# Patient Record
Sex: FEMALE | Race: WHITE
[De-identification: names, ages, dates, MRNs, and addresses within clinical notes are randomized per-mention and may not be internally consistent; named-entity substitution may affect disease eponyms.]

---

## 2018-02-08 NOTE — ER DOCUMENT REPORT
HPI





- HPI


Pain Level: 3


Notes: 





Patient is a 27-year-old female who presents to the ED complaining of nasal 

congestion/discharge, dry nonproductive cough, fever, body ache 2 days.  

Patient states that she is still eating and drinking without difficulties, but 

does have a decreased p.o. intake.  She is still urinating normally having 

normal bowel movements.  Patient has been using some over-the-counter meds for 

symptoms.  She denies any significant past medical history including 

cardiopulmonary history and immunocompromised conditions.  Patient denies any 

smoking or IV drug use.  Patient requesting work note.  Denies any current 

headache, neck pain, sore throat, chest pain, palpitations, syncope, shortness 

of breath, wheeze, dyspnea, abdominal pain, nausea/vomiting/diarrhea, urinary 

retention, dysuria, hematuria, or rash.





- ROS


Systems Reviewed and Negative: Yes All other systems reviewed and negative





- REPRODUCTIVE


LMP: 31 jan 17


Reproductive: REPORTS: Pregnant:





Past Medical History





- Social History


Smoking Status: Never Smoker


Family History: Reviewed & Not Pertinent





- Immunizations


Hx Diphtheria, Pertussis, Tetanus Vaccination: Yes - 6/29/14





Vertical Provider Document





- CONSTITUTIONAL


Agree With Documented VS: Yes


Notes: 





PHYSICAL EXAMINATION:





GENERAL: Well-appearing, well-nourished and in no acute distress.  A&Ox4.  

Answers questions appropriately.  Moves comfortably w/o notable distress





HEAD: Atraumatic, normocephalic.





EYES: Pupils equal round and reactive to light, extraocular movements intact, 

sclera anicteric, conjunctiva are normal.





ENT: EAC clear b/l.  TM's intact b/l without erythema, fluid, or perforation.  

Nares patent and with clear discharge.  oropharynx no erythema without 

exudates.  No tonsilar hypertrophy without erythema or exudate.  No palatine 

shift.  Uvula midline.  No tongue protrusion.  No drooling, hoarseness, or 

airway compromise.  Moist mucous membranes.  No sinus tenderness.





NECK: Normal range of motion, supple without lymphadenopathy.  No rigidity/

meningismus.





LUNGS: Breath sounds clear to auscultation bilaterally and equal.  No wheezes 

rales or rhonchi.  No retractions





HEART: Regular rate and rhythm without murmurs, rubs, gallops.





ABDOMEN: Soft, nontender, nondistended abdomen.  No guarding, no rebound.  No 

masses appreciated.  Normal bowel sounds present.  No CVA tenderness 

bilaterally.  No hepatosplenomegaly.





NEUROLOGICAL: Normal speech, normal gait.  Normal sensory, motor exams 





PSYCH: Normal mood, normal affect.





SKIN: Warm, Dry, normal turgor, no rashes or lesions noted.





- INFECTION CONTROL


TRAVEL OUTSIDE OF THE [place] IN LAST 30 DAYS: No





- RESPIRATORY


O2 Sat by Pulse Oximetry: 99





Course





- Re-evaluation


Re-evalutation: 





02/08/18 15:47


Patient is an afebrile, well-hydrated, 27-year-old female who presents ED with 

acute URI, suspect influenza.  Vitals are stable.  PE is otherwise 

unremarkable.  No labs or imaging warranted at this time based on H&P.  Patient 

has no significant cardiopulmonary or immunocompromised medical conditions.  

Patient's lungs are clear to auscultation bilaterally without tachycardia, 

hypoxia, or tachypnea.  Patient is tolerating p.o. without any difficulties.  

Low suspicion for any meningitis, sepsis, peritonsillar/pharyngeal abscess, 

respiratory compromise, severe dehydration, or other emergent systemic 

condition at this time.  Patient is aware this condition can change from 

initial presentation and she needs to monitor symptoms closely.  Conservative 

measures otherwise for symptoms.  Recheck with your PCM in 3-5 days.  Return to 

the ED with any worsening/concerning symptoms otherwise as reviewed in 

discharge.  Patient is in agreement.





- Vital Signs


Vital signs: 


 











Temp Pulse Resp BP Pulse Ox


 


 99.3 F   107 H  16   132/75 H  99 


 


 02/08/18 14:05  02/08/18 14:05  02/08/18 14:05  02/08/18 14:05  02/08/18 14:05














Discharge





- Discharge


Clinical Impression: 


 Influenza, Acute URI





Condition: Stable


Disposition: HOME, SELF-CARE


Instructions:  Upper Respiratory Illness ([facility]), Influenza ([facility])


Additional Instructions: 


Maintain adequate fluid intake


Take meds as directed


tylenol/ibuprofen as needed


over the counter cold medication as needed for symptoms


Humidified air may help


Wash your hands regularly


Wear a mask when coughing


F/u:  with your PCM in 3-5 days for a recheck





Return to the ED with any fever, worsening pain, chest pain, palpitations, 

syncope, worsening HA, neck pain/stiffness, shortness of breath, wheezing, 

drooling, trouble swallowing/breathing, abdominal pain, n/v/d, rash, or 

worsening/concerning symptoms otherwise.


Forms:  Elevated Blood Pressure, Return to Work


Referrals: 


[facility] [Provider Group] - Follow up as needed


[facility] [Provider Group] - Follow up as needed


[facility] [Provider Group] - Follow up as needed

## 2019-03-22 NOTE — OPERATIVE REPORT E
Operative Report



NAME: CÉSAR CHAVEZ

MRN:  [id number]          : 1990 AGE:  28Y

DATE OF SURGERY: 2019                        ROOM:



PREOPERATIVE DIAGNOSIS:

MISSED AB.



POSTOPERATIVE DIAGNOSIS:

MISSED AB.



OPERATION:

Suction dilatation and curettage.



SURGEON:

SOHAM LUNA M.D.



ANESTHESIA:

Dr. Albrecht, general.



FINDINGS:

Uterus sounded to approximately 10.5 cm.  Large amount of decidual type

material removed from the uterus.



ESTIMATED BLOOD LOSS:

200 mL



SPECIMENS REMOVED:

Products of conception.



PROCEDURE:

The patient was taken to the operating room, prepared and draped in normal

sterile fashion in dorsal lithotomy position.  Under sterile conditions,

the in and out catheter was performed with no sterile urine obtained.  A

sterile speculum was placed into the vagina and the cervix was grasped on

the anterior lip with a single-tooth tenaculum, and the cervix was prepped

with Betadine.  The uterus was then sounded to approximately 1.5 cm.  The

cervix was then dilated to accommodate an 8 mm curved curette, which was

introduced under suction and the uterine contents were evacuated using

suction.  Several passes were made until there was no further material

obtained.  A sharp curettage was performed and it was felt that there were

products of conception still present, so another couple of passes were

made with the suction device until no further products of conception were

obtained.  One more sharp curettage did reveal good grit of 360 degrees

with no further decidual material obtained with curettage.  The curette

was removed.  The tenaculum was removed.  We noted some bleeding at the

sites of the tenaculum.  This was made hemostatic with application of

sticks of silver nitrate.  The speculum was then removed and 800 mcg of

Cytotec was placed per rectum to help with uterine tonicity.  The patient

tolerated the procedure well.  Sponge, lap, and needle counts were correct

x2, and the patient was taken to recovery in stable condition.



DICTATING PHYSICIAN:  SOHAM LUNA M.D.





[id number]                  DT: 2019    1404

PHY#: [id number]            DD: 2019    1349

ID:   [id number]           JOB#: [id number]       ACCT: [id number]



cc:SOHAM LUNA M.D.

>

## 2019-04-01 NOTE — PDOC DISCHARGE SUMMARY
General





- Admit/Disc Date/PCP


Admission Date/Primary Care Provider: 


  19 10:56





  





Discharge Date: 19





- Discharge Diagnosis


(1) Status post D&C


Is this a current diagnosis for this admission?: Yes   





- Additional Information


Resuscitation Status: Full Code


Home Medications: 








Prenatal No.137/Iron/Folic Acd [Prenatal Vitamin Tablet] 1 each PO DAILY 

14 


Ibuprofen [Motrin 800 mg Tablet] 800 mg PO Q8 #30 tablet 14 


Cyclobenzaprine HCl [Flexeril 10 mg Tablet]  19 


Ibuprofen [Motrin 800 mg Tablet] 800 mg PO TID  tablet 19 











History of Present Illness


Patient complains of: Heavy vaginal bleeding after undergoing a D&C on 


History of Present Illness: 


CÉSAR CHAVEZ is a 28 year old  presented to the office today 

complaining of saturating 3 pads in 30 minutes.  This patient had a blighted 

ovum and underwent a suction D&C on .  Patient was seen in the office 

this morning and reported her heavy bleeding.  She underwent an ultrasound in 

the office, which showed retained products of conception.








Hospital Course


Hospital Course: 


The patient went to another suction dilatation and curettage, in which minimal 

amounts of products of conception were evacuated.  Patient states that her 

bleeding is minimal now.  She denies cramping.  Patient states that she is ready

to go home.








Physical Exam





- Physical Exam


Vital Signs: 


                                        











Temp Pulse Resp BP Pulse Ox


 


 98.1 F   88   14   119/76   100 


 


 19 16:19  19 16:19  19 16:19  19 16:19  19 16:19








                                 Intake & Output











 19





 06:59 06:59 06:59


 


Intake Total   1325


 


Output Total   50


 


Balance   1275


 


Weight   50 kg











General appearance: PRESENT: no acute distress


Respiratory exam: PRESENT: clear to auscultation maverick


Cardiovascular exam: PRESENT: RRR


GI/Abdominal exam: PRESENT: normal bowel sounds, soft


Extremities exam: ABSENT: calf tenderness, clubbing, full ROM, joint swelling, 

pedal edema, tenderness, +1 edema, +2 edema, other





- Gynecological Exam


Vagina: other - Minimal vaginal bleeding





Result


Laboratory Results: 


                                        





                                 19 13:00 





                                        











  19





  13:00 13:00


 


WBC   5.4


 


RBC   4.18


 


Hgb   12.5


 


Hct   36.5


 


MCV   87


 


MCH   29.9


 


MCHC   34.3


 


RDW   12.3


 


Plt Count   347


 


Blood Type  O POSITIVE 


 


Antibody Screen  NEGATIVE 














Plan


Discharge Plan: 


1.  Discharge home


 2.   Rx hydrocodone


 3.  Follow-up in the office in 2 weeks for a postoperative examination





Time Spent: Less than 30 Minutes

## 2019-04-10 NOTE — OPERATIVE REPORT E
Operative Report



NAME: CÉSAR CHAVEZ

MRN:  [id number]          : 1990 AGE:  28Y

DATE OF SURGERY: 2019                        ROOM: 206





PREOPERATIVE DIAGNOSIS:

Vaginal bleeding with incomplete or missed AB.



POSTOPERATIVE DIAGNOSIS:

Vaginal bleeding with incomplete or missed AB.



PROCEDURE:

Suction D and C.



SURGEON:

SOLANGE ESCOBEDO M.D.



ESTIMATED BLOOD LOSS:

Less than 20 mL.



TISSUE REMOVED:

Products of conception.



ANESTHESIA:

General.



DESCRIPTION OF PROCEDURE:

The patient was placed in a dorsal lithotomy position, prepped and draped

in the usual sterile fashion.  Speculum was placed.  Cervix was visualized

and grasped with a single-toothed tenaculum.  The uterus was sounded to a

depth of 9 cm.  The os was sufficiently dilated prior to insertion of #8

suction catheter.  Suction curettage was performed followed by sharp

curettage followed by repeat suction.  Single-toothed tenaculum was

removed.  Suction was removed.  The procedure was terminated.  She

tolerated it well and was taken to recovery room in good condition.









DICTATING PHYSICIAN:  SOLANGE ESCOBEDO M.D.





BGEDIT                  DT: 04/10/2019    1047

PHY#: [id number]            DD: 2019    0923

ID:   [id number]           JOB#: [id number]      ACCT: [id number]



cc:SOLANGE ESCOBEDO M.D.

>

## 2019-09-13 ENCOUNTER — HOSPITAL ENCOUNTER (EMERGENCY)
Dept: HOSPITAL 62 - ER | Age: 29
Discharge: HOME | End: 2019-09-13
Payer: COMMERCIAL

## 2019-09-13 VITALS — DIASTOLIC BLOOD PRESSURE: 77 MMHG | SYSTOLIC BLOOD PRESSURE: 122 MMHG

## 2019-09-13 DIAGNOSIS — O02.0: Primary | ICD-10-CM

## 2019-09-13 LAB
ADD MANUAL DIFF: NO
ALBUMIN SERPL-MCNC: 5 G/DL (ref 3.5–5)
ALP SERPL-CCNC: 53 U/L (ref 38–126)
ANION GAP SERPL CALC-SCNC: 11 MMOL/L (ref 5–19)
APPEARANCE UR: CLEAR
APTT PPP: YELLOW S
AST SERPL-CCNC: 22 U/L (ref 14–36)
BASOPHILS # BLD AUTO: 0 10^3/UL (ref 0–0.2)
BASOPHILS NFR BLD AUTO: 0.3 % (ref 0–2)
BILIRUB DIRECT SERPL-MCNC: 0.1 MG/DL (ref 0–0.4)
BILIRUB SERPL-MCNC: 0.2 MG/DL (ref 0.2–1.3)
BILIRUB UR QL STRIP: NEGATIVE
BUN SERPL-MCNC: 12 MG/DL (ref 7–20)
CALCIUM: 10.4 MG/DL (ref 8.4–10.2)
CHLORIDE SERPL-SCNC: 99 MMOL/L (ref 98–107)
CO2 SERPL-SCNC: 28 MMOL/L (ref 22–30)
EOSINOPHIL # BLD AUTO: 0.1 10^3/UL (ref 0–0.6)
EOSINOPHIL NFR BLD AUTO: 1 % (ref 0–6)
ERYTHROCYTE [DISTWIDTH] IN BLOOD BY AUTOMATED COUNT: 13 % (ref 11.5–14)
GLUCOSE SERPL-MCNC: 74 MG/DL (ref 75–110)
GLUCOSE UR STRIP-MCNC: NEGATIVE MG/DL
HCT VFR BLD CALC: 36.4 % (ref 36–47)
HGB BLD-MCNC: 12.5 G/DL (ref 12–15.5)
KETONES UR STRIP-MCNC: NEGATIVE MG/DL
LYMPHOCYTES # BLD AUTO: 2.1 10^3/UL (ref 0.5–4.7)
LYMPHOCYTES NFR BLD AUTO: 28.9 % (ref 13–45)
MCH RBC QN AUTO: 29.4 PG (ref 27–33.4)
MCHC RBC AUTO-ENTMCNC: 34.4 G/DL (ref 32–36)
MCV RBC AUTO: 85 FL (ref 80–97)
MONOCYTES # BLD AUTO: 0.4 10^3/UL (ref 0.1–1.4)
MONOCYTES NFR BLD AUTO: 5.8 % (ref 3–13)
NEUTROPHILS # BLD AUTO: 4.8 10^3/UL (ref 1.7–8.2)
NEUTS SEG NFR BLD AUTO: 64 % (ref 42–78)
NITRITE UR QL STRIP: NEGATIVE
PH UR STRIP: 6 [PH] (ref 5–9)
PLATELET # BLD: 316 10^3/UL (ref 150–450)
POTASSIUM SERPL-SCNC: 4.2 MMOL/L (ref 3.6–5)
PROT SERPL-MCNC: 7.7 G/DL (ref 6.3–8.2)
PROT UR STRIP-MCNC: NEGATIVE MG/DL
RBC # BLD AUTO: 4.26 10^6/UL (ref 3.72–5.28)
SP GR UR STRIP: 1.02
TOTAL CELLS COUNTED % (AUTO): 100 %
UROBILINOGEN UR-MCNC: NEGATIVE MG/DL (ref ?–2)
WBC # BLD AUTO: 7.4 10^3/UL (ref 4–10.5)

## 2019-09-13 PROCEDURE — 86901 BLOOD TYPING SEROLOGIC RH(D): CPT

## 2019-09-13 PROCEDURE — 81001 URINALYSIS AUTO W/SCOPE: CPT

## 2019-09-13 PROCEDURE — 99284 EMERGENCY DEPT VISIT MOD MDM: CPT

## 2019-09-13 PROCEDURE — 84702 CHORIONIC GONADOTROPIN TEST: CPT

## 2019-09-13 PROCEDURE — 85025 COMPLETE CBC W/AUTO DIFF WBC: CPT

## 2019-09-13 PROCEDURE — 76817 TRANSVAGINAL US OBSTETRIC: CPT

## 2019-09-13 PROCEDURE — 80053 COMPREHEN METABOLIC PANEL: CPT

## 2019-09-13 PROCEDURE — 86900 BLOOD TYPING SEROLOGIC ABO: CPT

## 2019-09-13 PROCEDURE — 36415 COLL VENOUS BLD VENIPUNCTURE: CPT

## 2019-09-13 NOTE — ER DOCUMENT REPORT
ED Medical Screen (RME)





- General


Chief Complaint: Vaginal Bleeding


Stated Complaint: VAGINAL BLEEDING


Time Seen by Provider: 19 19:45


Mode of Arrival: Ambulatory


Information source: Patient


Notes: 





29-year-old female presents to ED for complaint of vaginal bleeding while 9 

weeks pregnant.  She is alert oriented respirations regular and unlabored.  She 

denies any pain at this time.  She states she has not had an ultrasound before 

this.   4 para 2.  States she does not know her blood type.  We will get 

blood work RhoGam work-up and ultrasound.








I have greeted and performed a rapid initial assessment of this patient.  A 

comprehensive ED assessment and evaluation of the patient, analysis of test 

results and completion of medical decision making process will be conducted by 

an additional ED providers.


TRAVEL OUTSIDE OF THE [place] IN LAST 30 DAYS: No





- Related Data


Allergies/Adverse Reactions: 


                                        





No Known Allergies Allergy (Verified 19 19:45)


   











Past Medical History





- Past Medical History


Cardiac Medical History: 


   Denies: Hx Coronary Artery Disease, Hx Heart Attack, Hx Hypertension


Pulmonary Medical History: 


   Denies: Hx Asthma, Hx Bronchitis, Hx COPD, Hx Pneumonia


Neurological Medical History: Denies: Hx Cerebrovascular Accident, Hx Seizures


Renal/ Medical History: Denies: Hx Peritoneal Dialysis


Musculoskeltal Medical History: Denies Hx Arthritis





- Immunizations


Hx Diphtheria, Pertussis, Tetanus Vaccination: Yes - 14





Physical Exam





- Vital signs


Vitals: 





                                        











Temp Pulse Resp BP Pulse Ox


 


 98.6 F   88   16   116/73   100 


 


 19 19:05  19 19:05  19 19:05  19 19:05  19 19:05














Course





- Vital Signs


Vital signs: 





                                        











Temp Pulse Resp BP Pulse Ox


 


 98.6 F   88   16   116/73   100 


 


 19 19:05  19 19:05  19 19:05  19 19:05  19 19:05

## 2019-09-13 NOTE — RADIOLOGY REPORT (SQ)
EXAM DESCRIPTION: 



US PREGNANCY TRANSVAGINAL



COMPLETED DATE/TME:  09/13/2019 19:49



CLINICAL HISTORY: 



29 years, Female, vaginal bleeding during pregnancy



COMPARISON:

None.



TECHNIQUE:

Axial 2-D grayscale images of the pelvis were acquired. Doppler

was utilized.



LIMITATIONS:

None.



FINDINGS:



Uterus measures 10.7 x 6.0 x 7.8 cm in size. An intrauterine

gestational sac is identified with measurements as follows:

Mean sac diameter is 2.48 cm for an estimated gestational age of

seven weeks and four days

A yolk sac is identified though no fetal pole is clearly evident.

An area of irregular hypoechogenicity is noted about the

periphery of the gestational sac, specifically to the right,

measuring 2.9 x 1.4 x 2.4 cm in size.

Cervix is closed, measuring 2.32 cm in length.



Right ovary measures 2.1 x 1.8 x 1.3 cm in size. It demonstrates

normal echogenicity as well as normal low resistance arterial

waveforms/venous flow. It contains a small normal-appearing

follicle measuring 0.7 x 0.7 x 1.0 cm in size. The graft the left

ovary was not visualized.



IMPRESSION:



Large gestational sac containing a yolk sac though no fetal pole

is clearly identified. Given the large size of the gestational

sac (nearly 2.5 cm), a fetal pole should be identified. As such,

an anembryonic pregnancy is a possibility. Continued surveillance

is suggested.



Irregular area of hypoechogenicity located to the right of the

gestational sac could indicate a focus of subchorionic

hemorrhage.



Normal sonographic appearance of the right ovary. Nonvisualized

left ovary.

 



copyright 2011 [employer]- All Rights Reserved

## 2019-09-13 NOTE — ER DOCUMENT REPORT
ED GI/





- General


Chief Complaint: Vaginal Bleeding


Stated Complaint: VAGINAL BLEEDING


Time Seen by Provider: 09/13/19 19:45


Primary Care Provider: 


SOHAM LUNA MD [Primary Care Provider] - Follow up as needed


Mode of Arrival: Ambulatory


Notes: 





29-year-old female 9 weeks pregnant by dates.  Comes into the ER with vaginal 

bleeding that has stopped.  She went to the bathroom and she wiped and she saw 

pink on the tissue when she got scared and came to the ER.  She has not passed 

any clots not passed any significant blood.  She is pink on the toilet tissue.  

No abdominal pain no back pain no UTI symptoms.  Patient is a G4, P2 she has had

a an embryonic pregnancy in the past that required frequent follow-up with the 

OB doctors and ended up having to have a D&C.  Patient has just gone to the 

health department today and got set up with an OB doctor.  She has not seen any 

OB/GYN yet.


TRAVEL OUTSIDE OF THE [place] IN LAST 30 DAYS: No





- HPI


Patient complains to provider of: Pregnant.  No: Abdominal pain, Dysuria





- Related Data


Allergies/Adverse Reactions: 


                                        





No Known Allergies Allergy (Verified 09/13/19 19:45)


   











Past Medical History





- General


Information source: Patient





- Social History


Smoking Status: Never Smoker


Frequency of alcohol use: None


Drug Abuse: None


Lives with: Spouse/Significant other


Family History: Reviewed & Not Pertinent


Patient has suicidal ideation: No


Patient has homicidal ideation: No





- Medical History


Medical History: Negative





- Past Medical History


Cardiac Medical History: 


   Denies: Hx Coronary Artery Disease, Hx Heart Attack, Hx Hypertension


Pulmonary Medical History: 


   Denies: Hx Asthma, Hx Bronchitis, Hx COPD, Hx Pneumonia


Neurological Medical History: Denies: Hx Cerebrovascular Accident, Hx Seizures


Renal/ Medical History: Denies: Hx Peritoneal Dialysis


Musculoskeletal Medical History: Denies Hx Arthritis





- Immunizations


Hx Diphtheria, Pertussis, Tetanus Vaccination: Yes - 6/29/14





Review of Systems





- Review of Systems


Constitutional: No symptoms reported


EENT: No symptoms reported


Cardiovascular: No symptoms reported


Respiratory: No symptoms reported


Gastrointestinal: No symptoms reported


Genitourinary: No symptoms reported


Female Genitourinary: Pregnant, Vaginal bleeding


Musculoskeletal: No symptoms reported


Skin: No symptoms reported


-: Yes All other systems reviewed and negative





Physical Exam





- Vital signs


Vitals: 





                                        











Temp Pulse Resp BP Pulse Ox


 


 98.6 F   88   16   116/73   100 


 


 09/13/19 19:05  09/13/19 19:05  09/13/19 19:05  09/13/19 19:05  09/13/19 19:05














- Notes


Notes: 





PHYSICAL EXAMINATION:


 


GENERAL: Well-appearing, well-nourished and in no acute distress.


 


HEAD: Atraumatic, normocephalic.


 


EYES, extraocular movements intact, sclera anicteric, conjunctiva are normal.


 


ENT: nares patent, .  Moist mucous membranes.


 


NECK: Normal range of motion, supple without lymphadenopathy


 


LUNGS: Breath sounds clear to auscultation bilaterally and equal.  No wheezes 

rales or rhonchi.


 


HEART: Regular rate and rhythm without murmurs


 


ABDOMEN: Soft, nontender, normoactive bowel sounds.  No guarding, no rebound.  

No masses appreciated.


 


EXTREMITIES: Normal range of motion, no pitting or edema.  No cyanosis.


 


NEUROLOGICAL: No focal neurological deficits. Moves all extremities 

spontaneously and on command.


 


PSYCH: Normal mood, normal affect.


 


SKIN: Warm, Dry, normal turgor, no rashes or lesions noted.





Course





- Re-evaluation


Re-evalutation: 





09/13/19 22:59


I spoke with OB/GYN Dr. Shaheen wilson.  Follow-up in the office Monday.  Call 

for an appointment Monday morning.  At this time is expectant management.





I spoke with the patient about her results.  She has not sac on ultrasound but 

no fetal pole seen.  Possibly an embryonic pregnancy again.  Patient understood.

 I discussed with her the risks.  No ectopic seen on ultrasound.  I told her 

pelvic rest.  Follow-up with OB in Monday morning.


09/13/19 23:03








No pelvic exam done now.  Ultrasound is back and shows an antibiotic pregnancy. 

She is not bleeding currently.  She says she only had spotting.  I discussed 

this with the OB/GYN doctor.  She did not recommend any further testing.  Who is

14





- Vital Signs


Vital signs: 





                                        











Temp Pulse Resp BP Pulse Ox


 


 98.6 F   88   16   116/73   100 


 


 09/13/19 19:05  09/13/19 19:05  09/13/19 19:05  09/13/19 19:05  09/13/19 19:05














- Laboratory


Result Diagrams: 


                                 09/13/19 20:07





                                 09/13/19 20:07


Laboratory results interpreted by me: 





                                        











  09/13/19





  20:07


 


Glucose  74 L


 


Calcium  10.4 H


 


Beta HCG, Quant  45043.00 H














- Diagnostic Test


Radiology reviewed: Reports reviewed





Discharge





- Discharge


Clinical Impression: 


 Threatened miscarriage in early pregnancy, Vaginal bleeding, Anembryonic 

pregnancy





Condition: Stable


Disposition: HOME, SELF-CARE


Instructions:  Threatened Miscarriage ([facility]), Ob-Gyn Doctors


Additional Instructions: 


Return to the ER if any concerns.  Pelvic rest.  Plenty of fluids.  Call the 

OB/GYN doctor Monday morning for further care.


Referrals: 


SOHAM LUNA MD [Primary Care Provider] - Follow up as needed


NANCY PLUMMER DO [ACTIVE STAFF] - Follow up as needed

## 2019-09-19 ENCOUNTER — HOSPITAL ENCOUNTER (OUTPATIENT)
Dept: HOSPITAL 62 - ER | Age: 29
Setting detail: OBSERVATION
LOS: 3 days | Discharge: HOME | End: 2019-09-22
Attending: OBSTETRICS & GYNECOLOGY | Admitting: OBSTETRICS & GYNECOLOGY
Payer: MEDICAID

## 2019-09-19 DIAGNOSIS — R55: ICD-10-CM

## 2019-09-19 DIAGNOSIS — O03.1: Primary | ICD-10-CM

## 2019-09-19 DIAGNOSIS — I95.9: ICD-10-CM

## 2019-09-19 LAB
ADD MANUAL DIFF: NO
APPEARANCE UR: (no result)
APTT PPP: YELLOW S
BASOPHILS # BLD AUTO: 0 10^3/UL (ref 0–0.2)
BASOPHILS NFR BLD AUTO: 0.3 % (ref 0–2)
BILIRUB UR QL STRIP: NEGATIVE
EOSINOPHIL # BLD AUTO: 0.1 10^3/UL (ref 0–0.6)
EOSINOPHIL NFR BLD AUTO: 1.3 % (ref 0–6)
ERYTHROCYTE [DISTWIDTH] IN BLOOD BY AUTOMATED COUNT: 13 % (ref 11.5–14)
GLUCOSE UR STRIP-MCNC: NEGATIVE MG/DL
HCT VFR BLD CALC: 36.1 % (ref 36–47)
HGB BLD-MCNC: 12.5 G/DL (ref 12–15.5)
KETONES UR STRIP-MCNC: NEGATIVE MG/DL
LYMPHOCYTES # BLD AUTO: 2.6 10^3/UL (ref 0.5–4.7)
LYMPHOCYTES NFR BLD AUTO: 25.2 % (ref 13–45)
MCH RBC QN AUTO: 29.9 PG (ref 27–33.4)
MCHC RBC AUTO-ENTMCNC: 34.7 G/DL (ref 32–36)
MCV RBC AUTO: 86 FL (ref 80–97)
MONOCYTES # BLD AUTO: 0.6 10^3/UL (ref 0.1–1.4)
MONOCYTES NFR BLD AUTO: 5.7 % (ref 3–13)
NEUTROPHILS # BLD AUTO: 7.1 10^3/UL (ref 1.7–8.2)
NEUTS SEG NFR BLD AUTO: 67.5 % (ref 42–78)
NITRITE UR QL STRIP: NEGATIVE
PH UR STRIP: 9 [PH] (ref 5–9)
PLATELET # BLD: 319 10^3/UL (ref 150–450)
PROT UR STRIP-MCNC: NEGATIVE MG/DL
RBC # BLD AUTO: 4.19 10^6/UL (ref 3.72–5.28)
SP GR UR STRIP: 1.01
TOTAL CELLS COUNTED % (AUTO): 100 %
UROBILINOGEN UR-MCNC: NEGATIVE MG/DL (ref ?–2)
WBC # BLD AUTO: 10.5 10^3/UL (ref 4–10.5)

## 2019-09-19 PROCEDURE — 88305 TISSUE EXAM BY PATHOLOGIST: CPT

## 2019-09-19 PROCEDURE — 59812 TREATMENT OF MISCARRIAGE: CPT

## 2019-09-19 PROCEDURE — 01965 ANES INCOMPL/MISSED AB PX: CPT

## 2019-09-19 PROCEDURE — 93010 ELECTROCARDIOGRAM REPORT: CPT

## 2019-09-19 PROCEDURE — 85025 COMPLETE CBC W/AUTO DIFF WBC: CPT

## 2019-09-19 PROCEDURE — 93005 ELECTROCARDIOGRAM TRACING: CPT

## 2019-09-19 PROCEDURE — 86901 BLOOD TYPING SEROLOGIC RH(D): CPT

## 2019-09-19 PROCEDURE — 86850 RBC ANTIBODY SCREEN: CPT

## 2019-09-19 PROCEDURE — 94799 UNLISTED PULMONARY SVC/PX: CPT

## 2019-09-19 PROCEDURE — 36415 COLL VENOUS BLD VENIPUNCTURE: CPT

## 2019-09-19 PROCEDURE — 82962 GLUCOSE BLOOD TEST: CPT

## 2019-09-19 PROCEDURE — G0378 HOSPITAL OBSERVATION PER HR: HCPCS

## 2019-09-19 PROCEDURE — 96374 THER/PROPH/DIAG INJ IV PUSH: CPT

## 2019-09-19 PROCEDURE — 86920 COMPATIBILITY TEST SPIN: CPT

## 2019-09-19 PROCEDURE — P9016 RBC LEUKOCYTES REDUCED: HCPCS

## 2019-09-19 PROCEDURE — 76801 OB US < 14 WKS SINGLE FETUS: CPT

## 2019-09-19 PROCEDURE — 99285 EMERGENCY DEPT VISIT HI MDM: CPT

## 2019-09-19 PROCEDURE — 84702 CHORIONIC GONADOTROPIN TEST: CPT

## 2019-09-19 PROCEDURE — 96361 HYDRATE IV INFUSION ADD-ON: CPT

## 2019-09-19 PROCEDURE — 36430 TRANSFUSION BLD/BLD COMPNT: CPT

## 2019-09-19 PROCEDURE — 81001 URINALYSIS AUTO W/SCOPE: CPT

## 2019-09-19 PROCEDURE — 86900 BLOOD TYPING SEROLOGIC ABO: CPT

## 2019-09-19 NOTE — RADIOLOGY REPORT (SQ)
US LESS THAN 14 WEEKS PREGNANCY 



CLINICAL STATEMENT: First trimester bleed



Findings: Uterus is anteverted and measures 11.2 x 6.8 x 5.8 cm.

There is no IUP. Endometrium is moderately thickened and

heterogeneous measuring 1.4 cm. There is mild vascularity noted

in the region of the endometrium. Right ovary measures 2.5 x 2.0

x 2.5 cm. Left ovary measures 1.6 x 1.6 x 2.0 cm. No suspicious

abnormal adnexal masses. No free fluid in the cul-de-sac. Cervix

measures 2.5 cm in length and demonstrates mild fluid.



IMPRESSION:



No IUP noted. Probable retained products of conception. Correlate

with beta-hCG levels.

## 2019-09-19 NOTE — ER DOCUMENT REPORT
ED GI/





- General


Chief Complaint: Vaginal Bleeding


Stated Complaint: VAGINAL BLEEDING


Time Seen by Provider: 19 20:11


Information source: Patient, Parent, Relative


TRAVEL OUTSIDE OF THE [place] IN LAST 30 DAYS: No





- HPI


Patient complains to provider of: Vaginal bleeding - This is a 29-year-old 

female who is G4, P2 6 weeks pregnant who presents today with a complaint of 

vaginal bleeding.  Patient states that she started having some vaginal bleeding 

on Friday.  She was seen and had an ultrasound that was done.  They were unable 

to see the gestational sac.  Mom states that in follow-up with OB/GYN, they were

able to see the gestational sac which is about 6 weeks.  She was put on 

progesterone vaginal suppository.  Bleeding was controlled.  Patient states the 

bleeding started again today while she was at a volleyball game.  She describes 

a significant amount of blood and blood clots.  Since her symptoms include 

pelvic pain.


Quality of pain: Cramping


Severity at maximum: Moderate





- Related Data


Allergies/Adverse Reactions: 


                                        





No Known Allergies Allergy (Verified 19 19:45)


   











Past Medical History





- Social History


Smoking Status: Never Smoker


Chew tobacco use (# tins/day): No


Frequency of alcohol use: None


Drug Abuse: None


Family History: Reviewed & Not Pertinent


Patient has suicidal ideation: No


Patient has homicidal ideation: No





- Past Medical History


Cardiac Medical History: 


   Denies: Hx Coronary Artery Disease, Hx Heart Attack, Hx Hypertension


Pulmonary Medical History: 


   Denies: Hx Asthma, Hx Bronchitis, Hx COPD, Hx Pneumonia


Neurological Medical History: Denies: Hx Cerebrovascular Accident, Hx Seizures


Renal/ Medical History: Denies: Hx Peritoneal Dialysis


Musculoskeletal Medical History: Denies Hx Arthritis





- Immunizations


Hx Diphtheria, Pertussis, Tetanus Vaccination: Yes - 14





Review of Systems





- Review of Systems


Cardiovascular: No symptoms reported


Respiratory: No symptoms reported


Gastrointestinal: Abdominal pain


Female Genitourinary: Pregnant, Vaginal bleeding


-: Yes All other systems reviewed and negative





Physical Exam





- Vital signs


Vitals: 


                                        











Temp Pulse Resp BP Pulse Ox


 


 99.5 F   105 H  18   119/70   100 


 


 19 20:00  19 20:00  19 20:00  19 20:00  19 20:00














- General


General appearance: Alert


In distress: None





- Respiratory


Respiratory status: No respiratory distress


Breath sounds: Normal





- Cardiovascular


Rhythm: Regular


Heart sounds: Normal auscultation


Murmur: No





- Abdominal


Inspection: Normal


Distension: No distension


Bowel sounds: Normal


Tenderness: Tender - There is slight suprapubic tenderness.  No guarding or 

rebound.





- Genitourinary


Speculum exam: Cervix closed


Vaginal bleeding: Heavy - There is a heavy amount of vaginal bleeding.  Cervix 

is closed.  No cervical motion tenderness. RN was chaperone. NO tissue seen





- Extremities


General upper extremity: Normal inspection





- Neurological


Neuro grossly intact: Yes


Cognition: Normal


Orientation: AAOx4





Course





- Re-evaluation


Re-evalutation: 


Differential diagnosis includes spontaneous  versus complete .  

Will repeat ultrasound.  Will check CBC.


19 20:50





19 21:58


Pt's care discussed with Dr. Wilkerson. Labs and US findings discussed also. He 

recommends Cytotec 800mg x 1 and d/c home on Motrin to f/u with her OB.





I have discussed f/u with pt and family., They are concerned about the bleeding.

I will get a repeat CBC. They are fine with this plan


19 23:25





Patient's care discussed with Dr. Wilkerson again.  I discussed concerns about 

continued bleeding.  Patient has lots of bleeding.  He will admit the patient 

for observation.


19 23:35





Patient had a vagal reaction.  She had a vasovagal syncopal episode when she dry

heaves.  Patient was promptly evaluated.  She is back to her normal.  Vital 

signs are unremarkable at this time.  We will continue to observe her.  We will 

continue to hydrate her.  Will get EKG.





- Vital Signs


Vital signs: 


                                        











Temp Pulse Resp BP Pulse Ox


 


 99.5 F   105 H  17   120/82   100 


 


 19 20:00  19 20:00  19 23:01  19 23:01  19 23:01














- Laboratory


Result Diagrams: 


                                 19 23:47





Laboratory results interpreted by me: 


                                        











  19





  19:20 22:21 23:47


 


RBC    2.78 L


 


Hgb    8.2 L D


 


Hct    23.6 L


 


Beta HCG, Quant  50134.00 H  


 


Urine Blood   LARGE H 














Discharge





- Discharge


Clinical Impression: 


 Threatened miscarriage in early pregnancy, Vaginal bleeding





Condition: Fair


Disposition: ADMITTED AS OBSERVATION


Admitting Provider: Dr. wilkerson (OB GYN)


Unit Admitted: Post Partum

## 2019-09-20 LAB
ADD MANUAL DIFF: NO
BASOPHILS # BLD AUTO: 0 10^3/UL (ref 0–0.2)
BASOPHILS NFR BLD AUTO: 0.3 % (ref 0–2)
BASOPHILS NFR BLD AUTO: 0.3 % (ref 0–2)
BASOPHILS NFR BLD AUTO: 0.5 % (ref 0–2)
EOSINOPHIL # BLD AUTO: 0 10^3/UL (ref 0–0.6)
EOSINOPHIL # BLD AUTO: 0 10^3/UL (ref 0–0.6)
EOSINOPHIL # BLD AUTO: 0.1 10^3/UL (ref 0–0.6)
EOSINOPHIL NFR BLD AUTO: 0 % (ref 0–6)
EOSINOPHIL NFR BLD AUTO: 0 % (ref 0–6)
EOSINOPHIL NFR BLD AUTO: 0.7 % (ref 0–6)
ERYTHROCYTE [DISTWIDTH] IN BLOOD BY AUTOMATED COUNT: 12.7 % (ref 11.5–14)
ERYTHROCYTE [DISTWIDTH] IN BLOOD BY AUTOMATED COUNT: 12.9 % (ref 11.5–14)
ERYTHROCYTE [DISTWIDTH] IN BLOOD BY AUTOMATED COUNT: 13.6 % (ref 11.5–14)
ERYTHROCYTE [DISTWIDTH] IN BLOOD BY AUTOMATED COUNT: 14 % (ref 11.5–14)
HCT VFR BLD CALC: 19.4 % (ref 36–47)
HCT VFR BLD CALC: 23.6 % (ref 36–47)
HCT VFR BLD CALC: 32.5 % (ref 36–47)
HCT VFR BLD CALC: 34.8 % (ref 36–47)
HGB BLD-MCNC: 11.5 G/DL (ref 12–15.5)
HGB BLD-MCNC: 12.2 G/DL (ref 12–15.5)
HGB BLD-MCNC: 6.6 G/DL (ref 12–15.5)
HGB BLD-MCNC: 8.2 G/DL (ref 12–15.5)
LYMPHOCYTES # BLD AUTO: 1.3 10^3/UL (ref 0.5–4.7)
LYMPHOCYTES # BLD AUTO: 1.3 10^3/UL (ref 0.5–4.7)
LYMPHOCYTES # BLD AUTO: 2.4 10^3/UL (ref 0.5–4.7)
LYMPHOCYTES NFR BLD AUTO: 13.7 % (ref 13–45)
LYMPHOCYTES NFR BLD AUTO: 14.6 % (ref 13–45)
LYMPHOCYTES NFR BLD AUTO: 24.9 % (ref 13–45)
MCH RBC QN AUTO: 29 PG (ref 27–33.4)
MCH RBC QN AUTO: 29.4 PG (ref 27–33.4)
MCH RBC QN AUTO: 29.9 PG (ref 27–33.4)
MCH RBC QN AUTO: 29.9 PG (ref 27–33.4)
MCHC RBC AUTO-ENTMCNC: 33.8 G/DL (ref 32–36)
MCHC RBC AUTO-ENTMCNC: 34.6 G/DL (ref 32–36)
MCHC RBC AUTO-ENTMCNC: 35.1 G/DL (ref 32–36)
MCHC RBC AUTO-ENTMCNC: 35.4 G/DL (ref 32–36)
MCV RBC AUTO: 84 FL (ref 80–97)
MCV RBC AUTO: 85 FL (ref 80–97)
MCV RBC AUTO: 85 FL (ref 80–97)
MCV RBC AUTO: 86 FL (ref 80–97)
MONOCYTES # BLD AUTO: 0.3 10^3/UL (ref 0.1–1.4)
MONOCYTES # BLD AUTO: 0.5 10^3/UL (ref 0.1–1.4)
MONOCYTES # BLD AUTO: 0.6 10^3/UL (ref 0.1–1.4)
MONOCYTES NFR BLD AUTO: 3.4 % (ref 3–13)
MONOCYTES NFR BLD AUTO: 5 % (ref 3–13)
MONOCYTES NFR BLD AUTO: 6.6 % (ref 3–13)
NEUTROPHILS # BLD AUTO: 6.4 10^3/UL (ref 1.7–8.2)
NEUTROPHILS # BLD AUTO: 7.4 10^3/UL (ref 1.7–8.2)
NEUTROPHILS # BLD AUTO: 7.7 10^3/UL (ref 1.7–8.2)
NEUTS SEG NFR BLD AUTO: 67.3 % (ref 42–78)
NEUTS SEG NFR BLD AUTO: 80.1 % (ref 42–78)
NEUTS SEG NFR BLD AUTO: 82.6 % (ref 42–78)
PLATELET # BLD: 169 10^3/UL (ref 150–450)
PLATELET # BLD: 171 10^3/UL (ref 150–450)
PLATELET # BLD: 245 10^3/UL (ref 150–450)
PLATELET # BLD: 253 10^3/UL (ref 150–450)
RBC # BLD AUTO: 2.26 10^6/UL (ref 3.72–5.28)
RBC # BLD AUTO: 2.78 10^6/UL (ref 3.72–5.28)
RBC # BLD AUTO: 3.85 10^6/UL (ref 3.72–5.28)
RBC # BLD AUTO: 4.08 10^6/UL (ref 3.72–5.28)
TOTAL CELLS COUNTED % (AUTO): 100 %
WBC # BLD AUTO: 9.2 10^3/UL (ref 4–10.5)
WBC # BLD AUTO: 9.3 10^3/UL (ref 4–10.5)
WBC # BLD AUTO: 9.4 10^3/UL (ref 4–10.5)
WBC # BLD AUTO: 9.8 10^3/UL (ref 4–10.5)

## 2019-09-20 PROCEDURE — 30233N1 TRANSFUSION OF NONAUTOLOGOUS RED BLOOD CELLS INTO PERIPHERAL VEIN, PERCUTANEOUS APPROACH: ICD-10-PCS | Performed by: OBSTETRICS & GYNECOLOGY

## 2019-09-20 PROCEDURE — 10D17Z9 MANUAL EXTRACTION OF PRODUCTS OF CONCEPTION, RETAINED, VIA NATURAL OR ARTIFICIAL OPENING: ICD-10-PCS | Performed by: OBSTETRICS & GYNECOLOGY

## 2019-09-20 RX ADMIN — METHYLERGONOVINE SCH MG: 0.2 TABLET ORAL at 14:12

## 2019-09-20 RX ADMIN — OXYCODONE AND ACETAMINOPHEN PRN TAB: 5; 325 TABLET ORAL at 12:36

## 2019-09-20 RX ADMIN — FENTANYL CITRATE ONE MCG: 50 INJECTION INTRAMUSCULAR; INTRAVENOUS at 11:20

## 2019-09-20 RX ADMIN — METHYLERGONOVINE SCH MG: 0.2 TABLET ORAL at 21:11

## 2019-09-20 RX ADMIN — DOCUSATE SODIUM SCH MG: 100 CAPSULE, LIQUID FILLED ORAL at 17:01

## 2019-09-20 RX ADMIN — OXYCODONE AND ACETAMINOPHEN PRN TAB: 5; 325 TABLET ORAL at 16:59

## 2019-09-20 RX ADMIN — FENTANYL CITRATE ONE MCG: 50 INJECTION INTRAMUSCULAR; INTRAVENOUS at 11:30

## 2019-09-20 NOTE — RADIOLOGY REPORT (SQ)
EXAM DESCRIPTION:  U/S [id number] TRNABD 1GES W/ODOP



COMPLETED DATE/TIME:  9/20/2019 7:48 am



REASON FOR STUDY:  missed AB



COMPARISON:  9/19/2019 9/13/2019



TECHNIQUE:  Transabdominal static and realtime grayscale images acquired of the pelvis. Additional se
lected spectral and color Doppler images recorded. All images stored on PACs.

CLINICAL AGE: 10 weeks 5 days

BHCG: Not applicable.



LIMITATIONS:  None.



FINDINGS:  UTERUS: No visualized intrauterine pregnancy.  Thickened heterogeneous endometrium.  There
 is fluid and debris within the cervix.

RIGHT ADNEXA: Ovary not identified due to poor acoustical window.

No adnexal free fluid.

No adnexal masses.

LEFT ADNEXA: Ovary not identified due to poor acoustical window.

No adnexal free fluid.

No adnexal masses.

FREE FLUID: None.

OTHER: No other significant finding.



IMPRESSION:  No visualized intra or extrauterine pregnancy.  Fluid and debris is noted within the cer
vix.  Persistent thickened and heterogeneous endometrium.  Endometrium is measured 1.3 cm compared to
 1.41 cm on prior study.  This most likely represents blood products.  Retained products of conceptio
n cannot be excluded.  Again correlation with beta HCG may be beneficial.



TECHNICAL DOCUMENTATION:  JOB ID:  [id number]

 2011 [employer]- All Rights Reserved



Reading location - IP/workstation name: [email or web address]

## 2019-09-20 NOTE — EKG REPORT
SEVERITY:- BORDERLINE ECG -

SINUS RHYTHM

BORDERLINE PROLONGED QT INTERVAL

:

Confirmed by: Markell Saha MD 20-Sep-2019 07:41:13

## 2019-09-20 NOTE — PDOC H&P
History of Present Illness


Admission Date/PCP: 


  09/20/19 00:02





  J CARLOS DENT MD





Patient complains of: vaginal bleeeding


History of Present Illness: 


CÉSAR CHAVEZ is a 29 year old female


pt presented to ER with vaginal bleeding.  Pt had recent spontaneous AB in March

with a subsequent D&C.  Pt was seen about a week ago and an US revealed a 

gestational sac.  She presented to the Er with vaginal bleeding that was 

reported as having slowed.





Past Medical History


LMP: 7/7/19


Cardiac Medical History: 


   Denies: Coronary Artery Disease, Myocardial Infarction, Hypertension


Pulmonary Medical History: 


   Denies: Asthma, Bronchitis, Chronic Obstructive Pulmonary Disease (COPD), 

Pneumonia


Neurological Medical History: 


   Denies: Seizures


Musculoskeltal Medical History: 


   Denies: Arthritis





Social History


Smoking Status: Never Smoker


Drugs: None





- Advance Directive


Resuscitation Status: Full Code





Family History


Family History: None, Reviewed & Not Pertinent


Parental Family History Reviewed: No


Children Family History Reviewed: No


Sibling(s) Family History Reviewed.: No





Medication/Allergy


Home Medications: 








Prenatal No.137/Iron/Folic Acd [Prenatal Vitamin Tablet] 1 each PO DAILY 

06/27/14 


Ibuprofen [Motrin 800 mg Tablet] 800 mg PO Q8 #30 tablet 06/29/14 


Cyclobenzaprine HCl [Flexeril 10 mg Tablet]  03/22/19 


Ibuprofen [Motrin 800 mg Tablet] 800 mg PO TID  tablet 04/01/19 








Allergies/Adverse Reactions: 


                                        





No Known Allergies Allergy (Verified 09/13/19 19:45)


   











Physical Exam





- Physical Exam


Vital Signs: 


                                        











Temp Pulse Resp BP Pulse Ox


 


 98 F   86   18   98/82 L  100 


 


 09/20/19 07:02  09/20/19 07:02  09/20/19 07:02  09/20/19 07:02  09/20/19 07:02








                                 Intake & Output











 09/19/19 09/20/19 09/21/19





 06:59 06:59 06:59


 


Intake Total  1330 


 


Balance  1330 


 


Weight  54.6 kg 











General appearance: PRESENT: no acute distress


Respiratory exam: PRESENT: clear to auscultation maverick


Cardiovascular exam: PRESENT: RRR


GI/Abdominal exam: PRESENT: soft





Result


Laboratory Results: 


                                        





                                 09/20/19 06:25 





                                        











  09/19/19 09/19/19 09/19/19





  19:20 20:26 22:21


 


WBC  10.5  


 


RBC  4.19  


 


Hgb  12.5  


 


Hct  36.1  


 


MCV  86  


 


MCH  29.9  


 


MCHC  34.7  


 


RDW  13.0  


 


Plt Count  319  


 


Seg Neutrophils %  67.5  


 


Urine Color    YELLOW


 


Urine Appearance    CLOUDY


 


Urine pH    9.0


 


Ur Specific [place]    1.010


 


Urine Protein    NEGATIVE


 


Urine Glucose (UA)    NEGATIVE


 


Urine Ketones    NEGATIVE


 


Urine Blood    LARGE H


 


Urine Nitrite    NEGATIVE


 


Ur Leukocyte Esterase    NEGATIVE


 


Urine RBC (Auto)    >182


 


Blood Type   O POSITIVE 


 


Antibody Screen   NEGATIVE 














  09/19/19 09/20/19





  23:47 06:25


 


WBC  9.4  9.3


 


RBC  2.78 L  2.26 L


 


Hgb  8.2 L D  6.6 L


 


Hct  23.6 L  19.4 L


 


MCV  85  86


 


MCH  29.4  29.0


 


MCHC  34.6  33.8


 


RDW  12.9  12.7


 


Plt Count  245  253


 


Seg Neutrophils %  67.3  82.6 H


 


Urine Color  


 


Urine Appearance  


 


Urine pH  


 


Ur Specific Gravity  


 


Urine Protein  


 


Urine Glucose (UA)  


 


Urine Ketones  


 


Urine Blood  


 


Urine Nitrite  


 


Ur Leukocyte Esterase  


 


Urine RBC (Auto)  


 


Blood Type  


 


Antibody Screen  











Impressions: 


                                        





Obstetrics Ultrasound  09/19/19 20:37


IMPRESSION:


 


No IUP noted. Probable retained products of conception. Correlate


with beta-hCG levels.


 














Assessment & Plan





- Diagnosis


(1) Threatened miscarriage in early pregnancy


Is this a current diagnosis for this admission?: Yes   





(2) Vaginal bleeding


Is this a current diagnosis for this admission?: Yes   





- Plan Summary


Plan Summary: 





Pt is admitted for observation and given cytotec to effect complete ab.  Typed 

and crossed for 4 units prbc and will give as needed.

## 2019-09-20 NOTE — PROGRESS NOTE
Provider Note


Provider Note: 


Critical care: 9/20/2019





Start time: 6:04 AM





Critical care problem: Syncopal episode





The patient suffered a syncopal episode witnessed by her  who notified 

her nurse.  Nursing staff called a rapid response.  The patient was found to be 

hypotensive and had very thready peripheral pulses but was noted to have a heart

rate in the 110-120 range initially.  Patient has been having heavy vaginal 

bleeding throughout the night and was noted to have had a hemoglobin that fell 

from 12.5 yesterday afternoon to 8.2 at midnight without having received 

transfusion.  Patient was immediately given 1 L of IV crystalloid solution and 3

units of packed red blood cells were ordered for immediate transfusion.  A CBC 

and metabolic profile were also ordered.  The patient improved gradually while 

receiving the crystalloid solution and had significant improvement after 

initiation of her first packed red blood cell transfusion.  Approximately half 

of the blood had been given at the time of my last observation when her pulse 

had dropped to 92 and her blood pressure was 110/72.  Patient was conversant and

though still very fatigued was well oriented x3 and attentive to conversation.





In time 6:31 AM





Total critical care face to patient time: 27 minutes

## 2019-09-21 LAB
ADD MANUAL DIFF: NO
BASOPHILS # BLD AUTO: 0 10^3/UL (ref 0–0.2)
BASOPHILS NFR BLD AUTO: 0.2 % (ref 0–2)
EOSINOPHIL # BLD AUTO: 0 10^3/UL (ref 0–0.6)
EOSINOPHIL NFR BLD AUTO: 0.5 % (ref 0–6)
ERYTHROCYTE [DISTWIDTH] IN BLOOD BY AUTOMATED COUNT: 13.9 % (ref 11.5–14)
HCT VFR BLD CALC: 29.5 % (ref 36–47)
HGB BLD-MCNC: 10.5 G/DL (ref 12–15.5)
LYMPHOCYTES # BLD AUTO: 2.6 10^3/UL (ref 0.5–4.7)
LYMPHOCYTES NFR BLD AUTO: 30.5 % (ref 13–45)
MCH RBC QN AUTO: 30.1 PG (ref 27–33.4)
MCHC RBC AUTO-ENTMCNC: 35.8 G/DL (ref 32–36)
MCV RBC AUTO: 84 FL (ref 80–97)
MONOCYTES # BLD AUTO: 0.6 10^3/UL (ref 0.1–1.4)
MONOCYTES NFR BLD AUTO: 7.2 % (ref 3–13)
NEUTROPHILS # BLD AUTO: 5.2 10^3/UL (ref 1.7–8.2)
NEUTS SEG NFR BLD AUTO: 61.6 % (ref 42–78)
PLATELET # BLD: 156 10^3/UL (ref 150–450)
RBC # BLD AUTO: 3.5 10^6/UL (ref 3.72–5.28)
TOTAL CELLS COUNTED % (AUTO): 100 %
WBC # BLD AUTO: 8.5 10^3/UL (ref 4–10.5)

## 2019-09-21 RX ADMIN — DOCUSATE SODIUM SCH MG: 100 CAPSULE, LIQUID FILLED ORAL at 11:35

## 2019-09-21 RX ADMIN — DIPHENHYDRAMINE HYDROCHLORIDE PRN MG: 25 CAPSULE ORAL at 15:55

## 2019-09-21 RX ADMIN — METHYLERGONOVINE SCH MG: 0.2 TABLET ORAL at 01:47

## 2019-09-21 RX ADMIN — METHYLERGONOVINE SCH MG: 0.2 TABLET ORAL at 05:00

## 2019-09-21 RX ADMIN — METHYLERGONOVINE SCH MG: 0.2 TABLET ORAL at 14:39

## 2019-09-21 RX ADMIN — DIPHENHYDRAMINE HYDROCHLORIDE PRN MG: 25 CAPSULE ORAL at 22:06

## 2019-09-21 RX ADMIN — OXYCODONE AND ACETAMINOPHEN PRN TAB: 5; 325 TABLET ORAL at 17:38

## 2019-09-21 RX ADMIN — OXYCODONE AND ACETAMINOPHEN PRN TAB: 5; 325 TABLET ORAL at 04:57

## 2019-09-21 RX ADMIN — METHYLERGONOVINE SCH: 0.2 TABLET ORAL at 05:01

## 2019-09-21 RX ADMIN — METHYLERGONOVINE SCH MG: 0.2 TABLET ORAL at 11:35

## 2019-09-21 RX ADMIN — METHYLERGONOVINE SCH: 0.2 TABLET ORAL at 19:47

## 2019-09-21 NOTE — PDOC DISCHARGE SUMMARY
General





- Admit/Disc Date/PCP


Admission Date/Primary Care Provider: 


  09/20/19 00:02





  J CARLOS DENT MD





Discharge Date: 09/20/19





- Additional Information


Resuscitation Status: Full Code


Discharge Diet: As Tolerated


Discharge Activity: Activity As Tolerated


Prescriptions: 


Oxycodone HCl/Acetaminophen [Percocet 5-325 mg Tablet] 2 tab PO Q4HP PRN #28 t

ablet


 PRN Reason: 


Acetaminophen [Tylenol 325 mg Tablet] 650 mg PO Q4HP PRN #30 tablet


 PRN Reason: 


Docusate Sodium [Colace 100 mg Capsule] 100 mg PO BID #60 capsule


Methylergonovine Maleate [Methergine 0.2 mg Tablet] 0.2 mg PO Q6 #4 tablet


Simethicone [Mylicon 80 mg Chewable Tablet] 80 mg PO QIDP PRN #60 tab.chew


 PRN Reason: 


Home Medications: 








Acetaminophen [Tylenol 325 mg Tablet] 650 mg PO Q4HP PRN #30 tablet 09/21/19 


Docusate Sodium [Colace 100 mg Capsule] 100 mg PO BID #60 capsule 09/21/19 


Methylergonovine Maleate [Methergine 0.2 mg Tablet] 0.2 mg PO Q6 #4 tablet 

09/21/19 


Oxycodone HCl/Acetaminophen [Percocet 5-325 mg Tablet] 2 tab PO Q4HP PRN #28 

tablet 09/21/19 


Prenatal Vit/Dha [Prenatal Multi + Dha Capsule] 1 cap PO DAILY  capsule 09/21/19




Simethicone [Mylicon 80 mg Chewable Tablet] 80 mg PO QIDP PRN #60 tab.chew 

09/21/19 











History of Present Illness


Patient complains of: vaginal bleeding in pregnancy


History of Present Illness: 


CÉSAR CHAVEZ is a 29 year old female 6wks pregnant.  Vaginal bleeding








Hospital Course


Hospital Course: 


Presented to the ER with known pregnancy and vaginal bleeding.  Hb dropped form 

12 to 8 then patient was admitted to the floor and RRT was called likely 

secondary to blood loss.  hb 6.6 and then patient recussitated with IVF and 4 

units transfusion ordered.  Urgent D&C performed and patient stablized after 

surgery.  Now with scant blood loss.  Hb and Hct now where it would be approp 

for 4 units to get her.





Physical Exam





- Physical Exam


Vital Signs: 


                                        











Temp Pulse Resp BP Pulse Ox


 


 98.0 F   75   18   101/56 L  98 


 


 09/21/19 07:37  09/21/19 07:37  09/21/19 04:40  09/21/19 07:37  09/21/19 07:37








                                 Intake & Output











 09/20/19 09/21/19 09/22/19





 06:59 06:59 06:59


 


Intake Total 1330 1190 


 


Output Total 606 1602 


 


Balance 724 -412 


 


Weight 54.6 kg  











General appearance: PRESENT: no acute distress, well-developed, well-nourished


Head exam: PRESENT: atraumatic, normocephalic


Respiratory exam: PRESENT: clear to auscultation maverick, symmetrical, unlabored


Cardiovascular exam: PRESENT: RRR.  ABSENT: diastolic murmur, rubs, systolic 

murmur


GI/Abdominal exam: PRESENT: normal bowel sounds, soft.  ABSENT: distended, 

guarding, mass, organolmegaly, rebound, tenderness


Rectal exam: PRESENT: deferred


Extremities exam: PRESENT: full ROM.  ABSENT: calf tenderness, clubbing, pedal 

edema


Neurological exam: PRESENT: alert, awake, oriented to person, oriented to place,

oriented to time, oriented to situation, CN II-XII grossly intact.  ABSENT: 

motor sensory deficit


Psychiatric exam: PRESENT: appropriate affect, normal mood.  ABSENT: homicidal 

ideation, suicidal ideation


Skin exam: PRESENT: dry, intact, warm.  ABSENT: cyanosis, rash





Result


Laboratory Results: 


                                        





                                 09/21/19 07:09 





                                        











  09/19/19 09/20/19 09/20/19





  20:26 15:05 20:00


 


WBC   9.8  9.2


 


RBC   4.08  3.85


 


Hgb   12.2  D  11.5 L


 


Hct   34.8 L  32.5 L


 


MCV   85  84


 


MCH   29.9  29.9


 


MCHC   35.1  35.4


 


RDW   14.0  13.6


 


Plt Count   169  171


 


Seg Neutrophils %    80.1 H


 


Blood Type  O POSITIVE  


 


Antibody Screen  NEGATIVE  














  09/21/19





  07:09


 


WBC  8.5


 


RBC  3.50 L


 


Hgb  10.5 L


 


Hct  29.5 L


 


MCV  84


 


MCH  30.1


 


MCHC  35.8


 


RDW  13.9


 


Plt Count  156


 


Seg Neutrophils %  61.6


 


Blood Type 


 


Antibody Screen 











Impressions: 


                                        





Obstetrics Ultrasound  09/20/19 00:00


IMPRESSION:  No visualized intra or extrauterine pregnancy.  Fluid and debris is

noted within the cervix.  Persistent thickened and heterogeneous endometrium.  

Endometrium is measured 1.3 cm compared to 1.41 cm on prior study.  This most 

likely represents blood products.  Retained products of conception cannot be 

excluded.  Again correlation with beta HCG may be beneficial.


 











Status: Imported from PACS





Plan


Discharge Plan: 


Discharge to home





Acute Heart Failure





- **


Is this a Heart Failure Patient?: No

## 2019-09-22 VITALS — SYSTOLIC BLOOD PRESSURE: 102 MMHG | DIASTOLIC BLOOD PRESSURE: 58 MMHG

## 2019-09-22 RX ADMIN — DOCUSATE SODIUM SCH: 100 CAPSULE, LIQUID FILLED ORAL at 00:41

## 2019-09-22 NOTE — PDOC DISCHARGE SUMMARY
General





- Admit/Disc Date/PCP


Admission Date/Primary Care Provider: 


  09/20/19 00:02





  J CARLOS DENT MD





Discharge Date: 09/22/19





- Discharge Diagnosis


(1) Hemorrhage


Is this a current diagnosis for this admission?: Yes   





(2) Transfusion of blood during current hospitalisation


Is this a current diagnosis for this admission?: Yes   





(3) Status post D&C


Is this a current diagnosis for this admission?: Yes   





- Additional Information


Resuscitation Status: Full Code


Discharge Diet: As Tolerated


Discharge Activity: Activity As Tolerated


Prescriptions: 


Oxycodone HCl/Acetaminophen [Percocet 5-325 mg Tablet] 2 tab PO Q4HP PRN #28 

tablet


 PRN Reason: 


Acetaminophen [Tylenol 325 mg Tablet] 650 mg PO Q4HP PRN #30 tablet


 PRN Reason: 


Docusate Sodium [Colace 100 mg Capsule] 100 mg PO BID #60 capsule


Methylergonovine Maleate [Methergine 0.2 mg Tablet] 0.2 mg PO Q6 #4 tablet


Simethicone [Mylicon 80 mg Chewable Tablet] 80 mg PO QIDP PRN #60 tab.chew


 PRN Reason: 


Home Medications: 








Acetaminophen [Tylenol 325 mg Tablet] 650 mg PO Q4HP PRN #30 tablet 09/21/19 


Docusate Sodium [Colace 100 mg Capsule] 100 mg PO BID #60 capsule 09/21/19 


Methylergonovine Maleate [Methergine 0.2 mg Tablet] 0.2 mg PO Q6 #4 tablet 

09/21/19 


Oxycodone HCl/Acetaminophen [Percocet 5-325 mg Tablet] 2 tab PO Q4HP PRN #28 

tablet 09/21/19 


Prenatal Vit/Dha [Prenatal Multi + Dha Capsule] 1 cap PO DAILY  capsule 09/21/19




Simethicone [Mylicon 80 mg Chewable Tablet] 80 mg PO QIDP PRN #60 tab.chew 

09/21/19 











History of Present Illness


Patient complains of: vaginal bleeding in pregnancy


History of Present Illness: 


CÉSAR CHAVEZ is a 29 year old female 6wks pregnant.  Vaginal bleeding





Hospital Course


Hospital Course: 


Presented to the ER with known pregnancy and vaginal bleeding.  Hb dropped form 

12 to 8 then patient was admitted to the floor and RRT was called likely 

secondary to blood loss.  hb 6.6 and then patient recussitated with IVF and 4 

units transfusion ordered.  Urgent D&C performed and patient stablized after 

surgery.  Now with scant blood loss.  Hb and Hct now where it would be approp 

for 4 units to get her back to now around 29.  She decided to stay yesterday 

because she was continued to having pain and needing IV meds.  She now is off IV

meds and cramping improved.  She is able to ambulate and void without 

difficulty.





Physical Exam





- Physical Exam


Vital Signs: 


                                        











Temp Pulse Resp BP Pulse Ox


 


 98.2 F   78   18   102/58 L  100 


 


 09/22/19 03:41  09/22/19 03:41  09/22/19 03:41  09/22/19 03:41  09/22/19 03:41








                                 Intake & Output











 09/21/19 09/22/19 09/23/19





 06:59 06:59 06:59


 


Intake Total 1190 1230 


 


Output Total 1602  


 


Balance -412 1230 


 


Weight  55.7 kg 











General appearance: PRESENT: no acute distress, well-developed, well-nourished


Head exam: PRESENT: atraumatic, normocephalic


Neck exam: PRESENT: full ROM.  ABSENT: carotid bruit, JVD, lymphadenopathy, 

thyromegaly


Respiratory exam: PRESENT: clear to auscultation maverick, symmetrical, unlabored


Cardiovascular exam: PRESENT: RRR.  ABSENT: diastolic murmur, rubs, systolic 

murmur


Pulses: PRESENT: normal dorsalis pedis pul, +2 pedal pulses bilateral


Vascular exam: PRESENT: normal capillary refill


GI/Abdominal exam: PRESENT: normal bowel sounds, soft.  ABSENT: distended, 

guarding, mass, organolmegaly, rebound, tenderness


Rectal exam: PRESENT: deferred


Extremities exam: PRESENT: full ROM.  ABSENT: calf tenderness, clubbing, pedal 

edema


Neurological exam: PRESENT: alert, awake, oriented to person, oriented to place,

oriented to time, oriented to situation, CN II-XII grossly intact.  ABSENT: 

motor sensory deficit


Psychiatric exam: PRESENT: appropriate affect, normal mood.  ABSENT: homicidal 

ideation, suicidal ideation


Skin exam: PRESENT: dry, intact, warm.  ABSENT: cyanosis, rash





Result


Laboratory Results: 


                                        





                                 09/21/19 07:09 








Impressions: 


                                        





Obstetrics Ultrasound  09/20/19 00:00


IMPRESSION:  No visualized intra or extrauterine pregnancy.  Fluid and debris is

noted within the cervix.  Persistent thickened and heterogeneous endometrium.  

Endometrium is measured 1.3 cm compared to 1.41 cm on prior study.  This most 

likely represents blood products.  Retained products of conception cannot be 

excluded.  Again correlation with beta HCG may be beneficial.


 











Status: Imported from PACS





Acute Heart Failure





- **


Is this a Heart Failure Patient?: No

## 2019-10-04 ENCOUNTER — HOSPITAL ENCOUNTER (OUTPATIENT)
Dept: HOSPITAL 62 - OD | Age: 29
End: 2019-10-04
Attending: OBSTETRICS & GYNECOLOGY
Payer: MEDICAID

## 2019-10-04 DIAGNOSIS — O02.1: Primary | ICD-10-CM

## 2019-10-04 DIAGNOSIS — R22.41: ICD-10-CM

## 2019-10-04 DIAGNOSIS — Z13.0: ICD-10-CM

## 2019-10-04 LAB
ADD MANUAL DIFF: NO
ALBUMIN SERPL-MCNC: 4.5 G/DL (ref 3.5–5)
ALP SERPL-CCNC: 51 U/L (ref 38–126)
ANION GAP SERPL CALC-SCNC: 8 MMOL/L (ref 5–19)
AST SERPL-CCNC: 19 U/L (ref 14–36)
BASOPHILS # BLD AUTO: 0 10^3/UL (ref 0–0.2)
BASOPHILS NFR BLD AUTO: 0.7 % (ref 0–2)
BILIRUB DIRECT SERPL-MCNC: 0.1 MG/DL (ref 0–0.4)
BILIRUB SERPL-MCNC: 0.2 MG/DL (ref 0.2–1.3)
BUN SERPL-MCNC: 9 MG/DL (ref 7–20)
CALCIUM: 9.5 MG/DL (ref 8.4–10.2)
CHLORIDE SERPL-SCNC: 106 MMOL/L (ref 98–107)
CO2 SERPL-SCNC: 26 MMOL/L (ref 22–30)
EOSINOPHIL # BLD AUTO: 0.1 10^3/UL (ref 0–0.6)
EOSINOPHIL NFR BLD AUTO: 1.5 % (ref 0–6)
ERYTHROCYTE [DISTWIDTH] IN BLOOD BY AUTOMATED COUNT: 14.6 % (ref 11.5–14)
GLUCOSE SERPL-MCNC: 83 MG/DL (ref 75–110)
HCT VFR BLD CALC: 33.2 % (ref 36–47)
HGB BLD-MCNC: 11.4 G/DL (ref 12–15.5)
LYMPHOCYTES # BLD AUTO: 1.7 10^3/UL (ref 0.5–4.7)
LYMPHOCYTES NFR BLD AUTO: 32.4 % (ref 13–45)
MCH RBC QN AUTO: 29.8 PG (ref 27–33.4)
MCHC RBC AUTO-ENTMCNC: 34.2 G/DL (ref 32–36)
MCV RBC AUTO: 87 FL (ref 80–97)
MONOCYTES # BLD AUTO: 0.3 10^3/UL (ref 0.1–1.4)
MONOCYTES NFR BLD AUTO: 5.8 % (ref 3–13)
NEUTROPHILS # BLD AUTO: 3.2 10^3/UL (ref 1.7–8.2)
NEUTS SEG NFR BLD AUTO: 59.6 % (ref 42–78)
PLATELET # BLD: 448 10^3/UL (ref 150–450)
POTASSIUM SERPL-SCNC: 4.4 MMOL/L (ref 3.6–5)
PROT SERPL-MCNC: 7.2 G/DL (ref 6.3–8.2)
RBC # BLD AUTO: 3.8 10^6/UL (ref 3.72–5.28)
TOTAL CELLS COUNTED % (AUTO): 100 %
WBC # BLD AUTO: 5.4 10^3/UL (ref 4–10.5)

## 2019-10-04 PROCEDURE — 36415 COLL VENOUS BLD VENIPUNCTURE: CPT

## 2019-10-04 PROCEDURE — 85025 COMPLETE CBC W/AUTO DIFF WBC: CPT

## 2019-10-04 PROCEDURE — 80053 COMPREHEN METABOLIC PANEL: CPT

## 2019-10-04 PROCEDURE — 84702 CHORIONIC GONADOTROPIN TEST: CPT

## 2019-10-04 PROCEDURE — 74177 CT ABD & PELVIS W/CONTRAST: CPT

## 2019-10-04 NOTE — RADIOLOGY REPORT (SQ)
EXAM DESCRIPTION:  CT ABD/PELVIS WITH IV ONLY



COMPLETED DATE/TIME:  10/4/2019 1:28 pm



REASON FOR STUDY:  R22.41 LOCALIZED SWELLING, MASS AND LUMP, RIGHT LOWER LIMB O02.1  MISSED  
Z13.0  ENCNTR SCREEN FOR DIS OF THE BLD/BLD-FORM ORG/IMMUN ME R22.41  LOCALIZED SWELLING, MASS AND CLAUDETTE
MP, RIGHT LOWER LIMB

Recent D&C.



COMPARISON:  None.



TECHNIQUE:  CT scan of the abdomen and pelvis performed using helical scanning technique with dynamic
 intravenous contrast injection.  No oral contrast. Images reviewed with lung, soft tissue, and bone 
windows. Reconstructed coronal and sagittal MPR images reviewed. Delayed images for evaluation of the
 urinary system also acquired. All images stored on PACS.

All CT scanners at this facility use dose modulation, iterative reconstruction, and/or weight based d
osing when appropriate to reduce radiation dose to as low as reasonably achievable (ALARA).

[facility]: Dose Right  [facility]: CareDose    [facility]: Dose Right    [facility]: Teradose 4D    [facility]: [employer] Technologies



CONTRAST TYPE AND DOSE:  contrast/concentration: Isovue 350.00 mg/ml; Total Contrast Delivered: 63.0 
ml; Total Saline Delivered: 65.0 ml



RENAL FUNCTION:  None required. The patient is less than 50 years old.



RADIATION DOSE:  CT Rad equipment meets quality standard of care and radiation dose reduction techniq
ues were employed. CTDIvol: 3.8 - 4.2 mGy. DLP: 414 mGy-cm..



LIMITATIONS:  None.



FINDINGS:  LOWER CHEST: No significant findings. No nodules or infiltrates.

LIVER: Normal size. No masses.  No dilated ducts.

SPLEEN: Normal size. No focal lesions.

PANCREAS: No masses. No significant calcifications. No adjacent inflammation or peripancreatic fluid 
collections. Pancreatic duct not dilated.

GALLBLADDER: No identified stones by CT criteria. No inflammatory changes to suggest cholecystitis.

ADRENAL GLANDS: No significant masses or asymmetry.

RIGHT KIDNEY AND URETER: No solid masses.   No significant calcifications.   No hydronephrosis or hyd
roureter.

LEFT KIDNEY AND URETER: No solid masses.   No significant calcifications.   No hydronephrosis or hydr
oureter.

AORTA AND VESSELS: No aneurysm. No dissection. Renal arteries, SMA, celiac without stenosis.

RETROPERITONEUM: No retroperitoneal adenopathy, hemorrhage or masses.

BOWEL AND PERITONEAL CAVITY: No masses or inflammatory changes. No free fluid or peritoneal masses.

APPENDIX: Normal.

PELVIS: No mass.  No free fluid.  Small volume of fluid in the endometrial cavity.  Normal bladder.

ABDOMINAL WALL: No masses. No hernias.

BONES: No significant or acute findings.

OTHER: No other significant finding.



IMPRESSION:  No specific CT abnormality of the abdomen or pelvis.  There is a small volume of nonspec
ific fluid in the endometrial cavity.  This may be further evaluated by ultrasound, if, for example t
here is clinical concern for retained products of conception.



TECHNICAL DOCUMENTATION:  JOB ID:  [id number]

Quality ID # 436: Final reports with documentation of one or more dose reduction techniques (e.g., Au
tomated exposure control, adjustment of the mA and/or kV according to patient size, use of iterative 
reconstruction technique)

  [employer]- All Rights Reserved



Reading location - IP/workstation name: [email or web address]

## 2019-12-26 ENCOUNTER — HOSPITAL ENCOUNTER (EMERGENCY)
Dept: HOSPITAL 62 - ER | Age: 29
Discharge: HOME | End: 2019-12-26
Payer: MEDICAID

## 2019-12-26 VITALS — SYSTOLIC BLOOD PRESSURE: 102 MMHG | DIASTOLIC BLOOD PRESSURE: 65 MMHG
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
## 2019-12-26 DIAGNOSIS — Z23: ICD-10-CM

## 2019-12-26 DIAGNOSIS — S61.012A: Primary | ICD-10-CM

## 2019-12-26 PROCEDURE — 90471 IMMUNIZATION ADMIN: CPT

## 2019-12-26 PROCEDURE — 73130 X-RAY EXAM OF HAND: CPT

## 2019-12-26 PROCEDURE — 99282 EMERGENCY DEPT VISIT SF MDM: CPT

## 2019-12-26 PROCEDURE — 90715 TDAP VACCINE 7 YRS/> IM: CPT

## 2019-12-26 PROCEDURE — 12001 RPR S/N/AX/GEN/TRNK 2.5CM/<: CPT

## 2019-12-26 NOTE — RADIOLOGY REPORT (SQ)
EXAM DESCRIPTION:  HAND LEFT 3 VIEWS



COMPLETED DATE/TIME:  12/26/2019 11:24 am



REASON FOR STUDY:  laceration



COMPARISON:  None.



EXAM PARAMETERS:  NUMBER OF VIEWS:  4 views.

TECHNIQUE: AP, lateral and oblique  radiographic images acquired of the left hand.  Additional latera
l image obtained the from.

LIMITATIONS: None.



FINDINGS:  MINERALIZATION: Normal.

BONES: No acute fracture or dislocation.  No worrisome bone lesions.

JOINTS: No effusions.

SOFT TISSUES: No soft tissue swelling.  No foreign body.

OTHER: No other significant finding.



IMPRESSION:  No acute bony abnormality.  No radiopaque foreign body.



TECHNICAL DOCUMENTATION:  JOB ID:  [id number]

 2011 [employer]- All Rights Reserved



Reading location - IP/workstation name: [email or web address]

## 2019-12-26 NOTE — ER DOCUMENT REPORT
HPI





- HPI


Patient complains to provider of: laceration left thumb


Time Seen by Provider: 12/26/19 10:44


Onset: Just prior to arrival


Onset/Duration: Sudden


Quality of pain: Achy


Context: 





29-year-old female presents emergency department with laceration to her left 

thumb.  Reports she was trying to cut through a twist tie on a toy and cut 

herself with a knife.  She is unsure when her last tetanus was.  No active 

bleeding.  Patient reports it hurts to move her thumb.  She is right-hand 

dominant.


Associated Symptoms: None


Exacerbated by: Movement


Relieved by: Denies


Similar symptoms previously: No


Recently seen / treated by doctor: No





- REPRODUCTIVE


Reproductive: REPORTS: Pregnant:





Past Medical History





- General


Information source: Patient





- Social History


Smoking Status: Unknown if Ever Smoked


Cigarette use (# per day): No


Frequency of alcohol use: None


Drug Abuse: None


Lives with: Family


Family History: None, Reviewed & Not Pertinent


Patient has suicidal ideation: No


Patient has homicidal ideation: No





- Medical History


Medical History: Negative





- Past Medical History


Cardiac Medical History: 


   Denies: Hx Coronary Artery Disease, Hx Heart Attack, Hx Hypertension


Pulmonary Medical History: 


   Denies: Hx Asthma, Hx Bronchitis, Hx COPD, Hx Pneumonia


Neurological Medical History: Denies: Hx Cerebrovascular Accident, Hx Seizures


Renal/ Medical History: Denies: Hx Peritoneal Dialysis


Musculoskeletal Medical History: Denies Hx Arthritis


Surgical Hx: Negative





- Immunizations


Hx Diphtheria, Pertussis, Tetanus Vaccination: Yes - 6/29/14





Vertical Provider Document





- CONSTITUTIONAL


Agree With Documented VS: Yes


Exam Limitations: No Limitations


General Appearance: WD/WN, No Apparent Distress





- INFECTION CONTROL


TRAVEL OUTSIDE OF THE [place] IN LAST 30 DAYS: No





- HEENT


HEENT: Atraumatic, Normocephalic





- NECK


Neck: Supple





- RESPIRATORY


Respiratory: No Respiratory Distress





- CARDIOVASCULAR


Cardiovascular: Regular Rate





- MUSCULOSKELETAL/EXTREMETIES


Musculoskeletal/Extremeties: Tender - Left thumb





- NEURO


Level of Consciousness: Awake, Alert, Appropriate





- DERM


Integumentary: Warm, Dry, Laceration - Laceration no active bleeding to left 

thumb palmar side proximal approximately 1.5 cm.  Cap refill less than 2 seconds

good radial pulse.





Course





- Re-evaluation


Re-evalutation: 





12/26/19 10:50


Laceration noted to the left thumb.  X-ray tetanus ordered


12/26/19 11:34


                                        





Hand X-Ray  12/26/19 10:47


IMPRESSION:  No acute bony abnormality.  No radiopaque foreign body.


 





X-ray negative.  Patient received sutures to her left thumb without any 

problems.  She is able to flex and extend the thumb without complaints of pain. 

Instructed on signs and symptoms of infection instructed to return to the 

emergency department for concerns follow-up here in 14 days for suture removal. 

She verbalized understanding to all instructions.








- Vital Signs


Vital signs: 


                                        











Temp Pulse Resp BP Pulse Ox


 


 98.0 F   81   16   121/66   100 


 


 12/26/19 09:28  12/26/19 09:28  12/26/19 09:28  12/26/19 09:28  12/26/19 09:28














- Diagnostic Test


Radiology reviewed: Image reviewed, Reports reviewed





Procedures





- Laceration/Wound Repair


  ** Left Thumb


Wound length (cm): 2


Wound's Depth, Shape: Superficial


Anesthetic type: 1% Lidocaine


Volume Anesthetic (mLs): 2


Irrigated w/ Saline (mLs): 250


Wound Repaired With: Sutures


Suture Size/Type: 5:0


Number of Sutures: 6


Layer Closure?: No





Discharge





- Discharge


Clinical Impression: 


 Laceration with suture repair





Condition: Stable


Disposition: HOME, SELF-CARE


Instructions:  Laceration Care ([facility]), Soap Cleansing ([facility]), Tetanus Immunization

Given ([facility])


Additional Instructions: 


*You have been treated for laceration to your left thumb with suture repair


*Monitor the site for signs of  infection such as increasing pain,  redness, 

swelling, warmth


*Keep the site clean


*Follow up here in 10 to 14 days for suture removal


*Return to the emergency department earlier for signs of infection, concerns, 

worsening condition, needs


Referrals: 


DELORIS MUNOZ MD [Primary Care Provider] - Follow up as needed

## 2020-01-09 ENCOUNTER — HOSPITAL ENCOUNTER (EMERGENCY)
Dept: HOSPITAL 62 - ER | Age: 30
Discharge: HOME | End: 2020-01-09
Payer: COMMERCIAL

## 2020-01-09 VITALS — DIASTOLIC BLOOD PRESSURE: 62 MMHG | SYSTOLIC BLOOD PRESSURE: 128 MMHG

## 2020-01-09 DIAGNOSIS — S61.012D: Primary | ICD-10-CM
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
## 2020-01-09 PROCEDURE — 99281 EMR DPT VST MAYX REQ PHY/QHP: CPT

## 2020-01-09 NOTE — ER DOCUMENT REPORT
HPI





- HPI


Time Seen by Provider: 01/09/20 09:01


Pain Level: 0


Notes: 





29-year-old female presents emergency room for left thumb suture removal that 

was placed on 12/26/19.  Patient had 6 stitches placed in her left thumb for 2 

cm laceration.  Tetanus was up-to-date.  Patient states she accidentally cut 

herself on a twist tie.  Sutures have remained intact.  Denies any fevers or 

chills, denies any drainage from wound.  Reports she has had numbness to her 

finger since she sustained her laceration, does have full range of motion.  

Denies chest pain,palpitations,  shortness of breath, weakness, bowel or bladder

dysfunction, saddle anesthesia, numbness or tingling in bilateral upper or lower

extremities equally aside from her left thumb from her laceration, muscle 

paralysis, weakness in bilateral upper or lower extremities equally or rash. 





- REPRODUCTIVE


Reproductive: REPORTS: Pregnant:





Past Medical History





- General


Information source: Patient





- Social History


Smoking Status: Unknown if Ever Smoked


Family History: None, Reviewed & Not Pertinent


Patient has suicidal ideation: No


Patient has homicidal ideation: No





- Past Medical History


Cardiac Medical History: 


   Denies: Hx Coronary Artery Disease, Hx Heart Attack, Hx Hypertension


Pulmonary Medical History: 


   Denies: Hx Asthma, Hx Bronchitis, Hx COPD, Hx Pneumonia


Neurological Medical History: Denies: Hx Cerebrovascular Accident, Hx Seizures


Renal/ Medical History: Denies: Hx Peritoneal Dialysis


Musculoskeletal Medical History: Denies Hx Arthritis





- Immunizations


Hx Diphtheria, Pertussis, Tetanus Vaccination: Yes - 6/29/14





Vertical Provider Document





- CONSTITUTIONAL


Agree With Documented VS: Yes


Exam Limitations: No Limitations


General Appearance: WD/WN


Notes: 





PHYSICAL EXAMINATION: reviewed vital signs by RN





GENERAL: Well-appearing, well-nourished and in no acute distress.





HEAD: Atraumatic, normocephalic.





EYES: Pupils equal round and reactive to light, extraocular movements intact, 

conjunctiva are normal.





ENT: Nares patent, oropharynx clear without exudates.  Moist mucous membranes.





NECK: Normal range of motion, supple without lymphadenopathy





LUNGS: Breath sounds clear to auscultation bilaterally and equal.  No wheezes 

rales or rhonchi.





HEART: Regular rate and rhythm without murmurs





ABDOMEN: Soft, nontender, nondistended abdomen.  No guarding, no rebound.  No 

masses appreciated.





Female : deferred





Musculoskeletal: Normal range of motion, no pitting or edema.  No cyanosis.





NEUROLOGICAL: Cranial nerves grossly intact.  Normal speech, normal gait.  

Normal sensory, motor exams





PSYCH: Normal mood, normal affect.





SKIN: Warm, Dry, normal turgor, no rashes or lesions noted.  no pain to left 

phalange. no pain to wrist with  flexion, extension, inversion, eversion  of 

wrist. digits in right and left with full aprom..  + 2 BUE equally.  

Negative snuffbox tenderness bilaterally. radial pulses + 2 BUE equally. 

Negative kanavels sign. No open wounds or drainage from wrist. No  vascular 

compromise.No body crepitus or focal area of TTP. no pain with opposition, 

flexion, extension, abduction and adduction on left. Motor and sensory function 

of ulnar, radial, medial nerves intact bilaterally and equally. strength 5/5 in 

BUE equally.





- INFECTION CONTROL


TRAVEL OUTSIDE OF THE [place] IN LAST 30 DAYS: No





Course





- Re-evaluation


Re-evalutation: 





01/09/20 09:44


Afebrile vital stable no distress.  Nurse's notes reviewed.  Sutures were 

removed by tech.  Area without erythema induration or warmth to touch to left 

thumb.  Patient able to flex and extend abduct adduct and opposition full motor 

function.  Patient states that she does have diminished sensory function because

she speculates that a nerve was cut when she did lacerate her thumb on December 26.  Discussed with patient that she does have full range of motion she strength

is 5 out of 5 against resistance, able to extend and flex left thumb.  Advised 

to follow-up with orthopedic specialist for further evaluation specifically Dr. Cortez who does specialize in hands.  After performing a Medical Screening 

Examination, I estimate there is LOW risk for OPEN FRACTURE, COMPARTMENT 

SYNDROME, TENDON RUPTURE, ACUTE NEUROVASCULAR INJURY, or RETAINED FOREIGN BODY, 

thus I consider the discharge disposition reasonable. Also, there is no evidence

or peritonitis, sepsis, or toxicity.  I have reevaluated this patient multiple 

times and no significant life threatening changes are noted. The patient and I 

have discussed the diagnosis and risks, and we agree with discharging home with 

close follow-up with the understanding that symptoms and presentations can vega

e. We also discussed returning to the Emergency Department immediately if new or

worsening symptoms occur. We have discussed the symptoms which are most 

concerning (e.g., changing or worsening pain, fever, numbness, weakness, cool or

painful digits) that necessitate immediate return.


01/09/20 10:57








- Vital Signs


Vital signs: 


                                        











Temp Pulse Resp BP Pulse Ox


 


 98.7 F   78   18   128/62 H  100 


 


 01/09/20 08:41  01/09/20 08:41  01/09/20 08:41  01/09/20 08:41  01/09/20 08:41














Discharge





- Discharge


Clinical Impression: 


 Visit for suture removal





Condition: Stable


Disposition: HOME, SELF-CARE


Instructions:  Suture Removal


Additional Instructions: 


Return immediately for any new or worsening symptoms.





Follow up with primary care provider, call tomorrow to make followup 

appointment.


Forms:  Return to Work


Referrals: 


DELORIS MUNOZ MD [ACTIVE STAFF] - Follow up as needed


JEANA CORTEZ DO [ACTIVE STAFF] - Follow up in 3-5 days